# Patient Record
Sex: FEMALE | HISPANIC OR LATINO | Employment: PART TIME | URBAN - METROPOLITAN AREA
[De-identification: names, ages, dates, MRNs, and addresses within clinical notes are randomized per-mention and may not be internally consistent; named-entity substitution may affect disease eponyms.]

---

## 2020-05-12 ENCOUNTER — HOSPITAL ENCOUNTER (OUTPATIENT)
Dept: ULTRASOUND IMAGING | Facility: HOSPITAL | Age: 30
Discharge: HOME | End: 2020-05-12
Admitting: NURSE PRACTITIONER

## 2020-05-12 ENCOUNTER — TRANSCRIBE ORDERS (OUTPATIENT)
Dept: ADMINISTRATIVE | Facility: HOSPITAL | Age: 30
End: 2020-05-12

## 2020-05-12 ENCOUNTER — HOSPITAL ENCOUNTER (OUTPATIENT)
Dept: ULTRASOUND IMAGING | Facility: HOSPITAL | Age: 30
Discharge: HOME | End: 2020-05-12

## 2020-05-12 DIAGNOSIS — Z98.890 HISTORY OF MATERNAL CARDIAC SURGERY: ICD-10-CM

## 2020-05-12 DIAGNOSIS — Z98.890 HISTORY OF MATERNAL CARDIAC SURGERY: Primary | ICD-10-CM

## 2020-05-12 DIAGNOSIS — O09.899 HISTORY OF MATERNAL CARDIAC SURGERY: ICD-10-CM

## 2020-05-12 DIAGNOSIS — O09.899 HISTORY OF MATERNAL CARDIAC SURGERY: Primary | ICD-10-CM

## 2020-05-12 LAB
EXTERNAL ABO GROUPING: NORMAL
EXTERNAL HEPATITIS B SURFACE ANTIGEN: NEGATIVE
EXTERNAL HEPATITIS C AB: NORMAL
EXTERNAL RH FACTOR: POSITIVE
EXTERNAL RUBELLA QUALITATIVE: NORMAL
HIV1 P24 AG SERPL QL IA: NEGATIVE

## 2020-05-12 PROCEDURE — 76801 OB US < 14 WKS SINGLE FETUS: CPT

## 2020-05-12 PROCEDURE — 76817 TRANSVAGINAL US OBSTETRIC: CPT

## 2020-05-13 ENCOUNTER — TRANSCRIBE ORDERS (OUTPATIENT)
Dept: ADMINISTRATIVE | Facility: HOSPITAL | Age: 30
End: 2020-05-13

## 2020-05-13 DIAGNOSIS — Z98.890 HISTORY OF MATERNAL CARDIAC SURGERY: Primary | ICD-10-CM

## 2020-05-13 DIAGNOSIS — O09.899 HISTORY OF MATERNAL CARDIAC SURGERY: Primary | ICD-10-CM

## 2020-06-08 ENCOUNTER — HOSPITAL ENCOUNTER (OUTPATIENT)
Dept: ULTRASOUND IMAGING | Facility: HOSPITAL | Age: 30
Discharge: HOME | End: 2020-06-08
Admitting: NURSE PRACTITIONER

## 2020-06-08 DIAGNOSIS — O09.899 HISTORY OF MATERNAL CARDIAC SURGERY: ICD-10-CM

## 2020-06-08 DIAGNOSIS — Z98.890 HISTORY OF MATERNAL CARDIAC SURGERY: ICD-10-CM

## 2020-06-08 PROCEDURE — 76817 TRANSVAGINAL US OBSTETRIC: CPT

## 2020-06-30 ENCOUNTER — TRANSCRIBE ORDERS (OUTPATIENT)
Dept: ADMINISTRATIVE | Facility: HOSPITAL | Age: 30
End: 2020-06-30

## 2020-06-30 DIAGNOSIS — O09.899 HX OF PRETERM DELIVERY, CURRENTLY PREGNANT: Primary | ICD-10-CM

## 2020-07-06 ENCOUNTER — HOSPITAL ENCOUNTER (OUTPATIENT)
Dept: ULTRASOUND IMAGING | Facility: HOSPITAL | Age: 30
Discharge: HOME | End: 2020-07-06
Admitting: NURSE PRACTITIONER

## 2020-07-06 ENCOUNTER — APPOINTMENT (OUTPATIENT)
Dept: ULTRASOUND IMAGING | Facility: HOSPITAL | Age: 30
End: 2020-07-06

## 2020-07-06 ENCOUNTER — HOSPITAL ENCOUNTER (OUTPATIENT)
Dept: ULTRASOUND IMAGING | Facility: HOSPITAL | Age: 30
End: 2020-07-06

## 2020-07-06 DIAGNOSIS — O09.899 HX OF PRETERM DELIVERY, CURRENTLY PREGNANT: ICD-10-CM

## 2020-07-06 PROCEDURE — 76811 OB US DETAILED SNGL FETUS: CPT

## 2020-09-22 ENCOUNTER — TRANSCRIBE ORDERS (OUTPATIENT)
Dept: ADMINISTRATIVE | Facility: HOSPITAL | Age: 30
End: 2020-09-22

## 2020-09-22 DIAGNOSIS — Z36.89 ULTRASOUND SCAN TO EVALUATE PLACENTA LOCATION: Primary | ICD-10-CM

## 2020-09-29 ENCOUNTER — HOSPITAL ENCOUNTER (OUTPATIENT)
Dept: ULTRASOUND IMAGING | Facility: HOSPITAL | Age: 30
Discharge: HOME | End: 2020-09-29
Admitting: NURSE PRACTITIONER

## 2020-09-29 DIAGNOSIS — Z36.89 ULTRASOUND SCAN TO EVALUATE PLACENTA LOCATION: ICD-10-CM

## 2020-09-29 PROCEDURE — 76815 OB US LIMITED FETUS(S): CPT

## 2020-10-27 ENCOUNTER — HOSPITAL ENCOUNTER (OUTPATIENT)
Facility: HOSPITAL | Age: 30
Discharge: HOME | End: 2020-10-27
Attending: OBSTETRICS & GYNECOLOGY | Admitting: OBSTETRICS & GYNECOLOGY

## 2020-10-27 VITALS
SYSTOLIC BLOOD PRESSURE: 113 MMHG | BODY MASS INDEX: 23.09 KG/M2 | OXYGEN SATURATION: 99 % | WEIGHT: 130.29 LBS | HEART RATE: 67 BPM | DIASTOLIC BLOOD PRESSURE: 67 MMHG | RESPIRATION RATE: 18 BRPM | HEIGHT: 63 IN | TEMPERATURE: 97.8 F

## 2020-10-27 PROBLEM — Z34.90 PREGNANT: Status: ACTIVE | Noted: 2020-10-27

## 2020-10-27 LAB — EXTERNAL GROUP B STREP ANTIGEN: NORMAL

## 2020-10-27 PROCEDURE — 87081 CULTURE SCREEN ONLY: CPT | Performed by: OBSTETRICS & GYNECOLOGY

## 2020-10-27 PROCEDURE — G0463 HOSPITAL OUTPT CLINIC VISIT: HCPCS

## 2020-10-27 RX ORDER — PRENATAL VIT NO.126/IRON/FOLIC 28MG-0.8MG
TABLET ORAL DAILY
COMMUNITY
End: 2021-04-22

## 2020-10-29 LAB — BACTERIA SPEC AEROBE CULT: NORMAL

## 2020-11-01 ENCOUNTER — HOSPITAL ENCOUNTER (INPATIENT)
Facility: HOSPITAL | Age: 30
LOS: 2 days | Discharge: HOME | End: 2020-11-03
Attending: OBSTETRICS & GYNECOLOGY | Admitting: OBSTETRICS & GYNECOLOGY

## 2020-11-01 LAB
ABO GROUP BLD: NORMAL
BLD GP AB SCN SERPL QL: NEGATIVE
DEPRECATED RDW RBC AUTO: 41.6 FL (ref 37–54)
ERYTHROCYTE [DISTWIDTH] IN BLOOD BY AUTOMATED COUNT: 12.9 % (ref 12.3–15.4)
HCT VFR BLD AUTO: 33.6 % (ref 34–46.6)
HGB BLD-MCNC: 11.7 G/DL (ref 12–15.9)
HIV1+2 AB SER QL: NORMAL
MCH RBC QN AUTO: 32.3 PG (ref 26.6–33)
MCHC RBC AUTO-ENTMCNC: 34.9 G/DL (ref 31.5–35.7)
MCV RBC AUTO: 92.3 FL (ref 79–97)
PLATELET # BLD AUTO: 239 10*3/MM3 (ref 140–450)
PMV BLD AUTO: 7.7 FL (ref 6–12)
RBC # BLD AUTO: 3.64 10*6/MM3 (ref 3.77–5.28)
RH BLD: POSITIVE
T&S EXPIRATION DATE: NORMAL
WBC # BLD AUTO: 6.1 10*3/MM3 (ref 3.4–10.8)

## 2020-11-01 PROCEDURE — G0432 EIA HIV-1/HIV-2 SCREEN: HCPCS | Performed by: OBSTETRICS & GYNECOLOGY

## 2020-11-01 PROCEDURE — 86901 BLOOD TYPING SEROLOGIC RH(D): CPT

## 2020-11-01 PROCEDURE — 86850 RBC ANTIBODY SCREEN: CPT | Performed by: OBSTETRICS & GYNECOLOGY

## 2020-11-01 PROCEDURE — 86900 BLOOD TYPING SEROLOGIC ABO: CPT

## 2020-11-01 PROCEDURE — 10907ZC DRAINAGE OF AMNIOTIC FLUID, THERAPEUTIC FROM PRODUCTS OF CONCEPTION, VIA NATURAL OR ARTIFICIAL OPENING: ICD-10-PCS | Performed by: OBSTETRICS & GYNECOLOGY

## 2020-11-01 PROCEDURE — 85027 COMPLETE CBC AUTOMATED: CPT | Performed by: OBSTETRICS & GYNECOLOGY

## 2020-11-01 PROCEDURE — 86900 BLOOD TYPING SEROLOGIC ABO: CPT | Performed by: OBSTETRICS & GYNECOLOGY

## 2020-11-01 PROCEDURE — 86592 SYPHILIS TEST NON-TREP QUAL: CPT | Performed by: OBSTETRICS & GYNECOLOGY

## 2020-11-01 PROCEDURE — 86901 BLOOD TYPING SEROLOGIC RH(D): CPT | Performed by: OBSTETRICS & GYNECOLOGY

## 2020-11-01 RX ORDER — LANOLIN 100 %
OINTMENT (GRAM) TOPICAL
Status: DISCONTINUED | OUTPATIENT
Start: 2020-11-01 | End: 2020-11-03 | Stop reason: HOSPADM

## 2020-11-01 RX ORDER — ONDANSETRON 4 MG/1
4 TABLET, FILM COATED ORAL EVERY 8 HOURS PRN
Status: DISCONTINUED | OUTPATIENT
Start: 2020-11-01 | End: 2020-11-03 | Stop reason: HOSPADM

## 2020-11-01 RX ORDER — IBUPROFEN 600 MG/1
600 TABLET ORAL EVERY 6 HOURS PRN
Status: DISCONTINUED | OUTPATIENT
Start: 2020-11-01 | End: 2020-11-03 | Stop reason: HOSPADM

## 2020-11-01 RX ORDER — PRENATAL VIT/IRON FUM/FOLIC AC 27MG-0.8MG
1 TABLET ORAL DAILY
Status: DISCONTINUED | OUTPATIENT
Start: 2020-11-01 | End: 2020-11-03 | Stop reason: HOSPADM

## 2020-11-01 RX ORDER — OXYTOCIN-SODIUM CHLORIDE 0.9% IV SOLN 30 UNIT/500ML 30-0.9/5 UT/ML-%
SOLUTION INTRAVENOUS
Status: COMPLETED
Start: 2020-11-01 | End: 2020-11-01

## 2020-11-01 RX ORDER — HYDROCORTISONE ACETATE PRAMOXINE HCL 2.5; 1 G/100G; G/100G
1 CREAM TOPICAL AS NEEDED
Status: DISCONTINUED | OUTPATIENT
Start: 2020-11-01 | End: 2020-11-03 | Stop reason: HOSPADM

## 2020-11-01 RX ORDER — OXYTOCIN-SODIUM CHLORIDE 0.9% IV SOLN 30 UNIT/500ML 30-0.9/5 UT/ML-%
125 SOLUTION INTRAVENOUS CONTINUOUS PRN
Status: COMPLETED | OUTPATIENT
Start: 2020-11-01 | End: 2020-11-01

## 2020-11-01 RX ORDER — DOCUSATE SODIUM 100 MG/1
100 CAPSULE, LIQUID FILLED ORAL 2 TIMES DAILY
Status: DISCONTINUED | OUTPATIENT
Start: 2020-11-01 | End: 2020-11-03 | Stop reason: HOSPADM

## 2020-11-01 RX ORDER — LIDOCAINE HYDROCHLORIDE 10 MG/ML
30 INJECTION, SOLUTION EPIDURAL; INFILTRATION; INTRACAUDAL; PERINEURAL AS NEEDED
Status: DISCONTINUED | OUTPATIENT
Start: 2020-11-01 | End: 2020-11-01 | Stop reason: HOSPADM

## 2020-11-01 RX ORDER — IBUPROFEN 600 MG/1
600 TABLET ORAL ONCE
Status: COMPLETED | OUTPATIENT
Start: 2020-11-01 | End: 2020-11-01

## 2020-11-01 RX ORDER — SODIUM CHLORIDE 0.9 % (FLUSH) 0.9 %
1-10 SYRINGE (ML) INJECTION AS NEEDED
Status: DISCONTINUED | OUTPATIENT
Start: 2020-11-01 | End: 2020-11-03 | Stop reason: HOSPADM

## 2020-11-01 RX ORDER — METHYLERGONOVINE MALEATE 0.2 MG/ML
200 INJECTION INTRAVENOUS ONCE AS NEEDED
Status: DISCONTINUED | OUTPATIENT
Start: 2020-11-01 | End: 2020-11-01 | Stop reason: HOSPADM

## 2020-11-01 RX ORDER — BISACODYL 10 MG
10 SUPPOSITORY, RECTAL RECTAL DAILY PRN
Status: DISCONTINUED | OUTPATIENT
Start: 2020-11-02 | End: 2020-11-03 | Stop reason: HOSPADM

## 2020-11-01 RX ORDER — MISOPROSTOL 200 UG/1
800 TABLET ORAL AS NEEDED
Status: DISCONTINUED | OUTPATIENT
Start: 2020-11-01 | End: 2020-11-01 | Stop reason: HOSPADM

## 2020-11-01 RX ORDER — MAGNESIUM CARB/ALUMINUM HYDROX 105-160MG
30 TABLET,CHEWABLE ORAL DAILY PRN
Status: DISCONTINUED | OUTPATIENT
Start: 2020-11-01 | End: 2020-11-01 | Stop reason: HOSPADM

## 2020-11-01 RX ORDER — OXYTOCIN-SODIUM CHLORIDE 0.9% IV SOLN 30 UNIT/500ML 30-0.9/5 UT/ML-%
999 SOLUTION INTRAVENOUS ONCE
Status: COMPLETED | OUTPATIENT
Start: 2020-11-01 | End: 2020-11-01

## 2020-11-01 RX ORDER — SODIUM CHLORIDE, SODIUM LACTATE, POTASSIUM CHLORIDE, CALCIUM CHLORIDE 600; 310; 30; 20 MG/100ML; MG/100ML; MG/100ML; MG/100ML
125 INJECTION, SOLUTION INTRAVENOUS CONTINUOUS
Status: DISCONTINUED | OUTPATIENT
Start: 2020-11-01 | End: 2020-11-01

## 2020-11-01 RX ORDER — CARBOPROST TROMETHAMINE 250 UG/ML
250 INJECTION, SOLUTION INTRAMUSCULAR AS NEEDED
Status: DISCONTINUED | OUTPATIENT
Start: 2020-11-01 | End: 2020-11-01 | Stop reason: HOSPADM

## 2020-11-01 RX ORDER — SODIUM CHLORIDE 0.9 % (FLUSH) 0.9 %
3 SYRINGE (ML) INJECTION EVERY 12 HOURS SCHEDULED
Status: DISCONTINUED | OUTPATIENT
Start: 2020-11-01 | End: 2020-11-01 | Stop reason: HOSPADM

## 2020-11-01 RX ORDER — OXYTOCIN-SODIUM CHLORIDE 0.9% IV SOLN 30 UNIT/500ML 30-0.9/5 UT/ML-%
250 SOLUTION INTRAVENOUS CONTINUOUS
Status: DISPENSED | OUTPATIENT
Start: 2020-11-01 | End: 2020-11-01

## 2020-11-01 RX ORDER — SODIUM CHLORIDE 0.9 % (FLUSH) 0.9 %
10 SYRINGE (ML) INJECTION AS NEEDED
Status: DISCONTINUED | OUTPATIENT
Start: 2020-11-01 | End: 2020-11-01 | Stop reason: HOSPADM

## 2020-11-01 RX ADMIN — OXYTOCIN 125 ML/HR: 10 INJECTION INTRAVENOUS at 09:49

## 2020-11-01 RX ADMIN — IBUPROFEN 600 MG: 600 TABLET ORAL at 21:46

## 2020-11-01 RX ADMIN — OXYTOCIN 30 UNITS: 10 INJECTION INTRAVENOUS at 08:10

## 2020-11-01 RX ADMIN — IBUPROFEN 600 MG: 600 TABLET ORAL at 15:27

## 2020-11-01 RX ADMIN — SODIUM CHLORIDE, POTASSIUM CHLORIDE, SODIUM LACTATE AND CALCIUM CHLORIDE 125 ML/HR: 600; 310; 30; 20 INJECTION, SOLUTION INTRAVENOUS at 05:35

## 2020-11-01 RX ADMIN — DOCUSATE SODIUM 100 MG: 100 CAPSULE, LIQUID FILLED ORAL at 21:46

## 2020-11-01 RX ADMIN — PRENATAL VIT W/ FE FUMARATE-FA TAB 27-0.8 MG 1 TABLET: 27-0.8 TAB at 15:01

## 2020-11-01 RX ADMIN — DOCUSATE SODIUM 100 MG: 100 CAPSULE, LIQUID FILLED ORAL at 15:00

## 2020-11-01 RX ADMIN — OXYTOCIN-SODIUM CHLORIDE 0.9% IV SOLN 30 UNIT/500ML 30 UNITS: 30-0.9/5 SOLUTION at 08:10

## 2020-11-01 RX ADMIN — WITCH HAZEL 1 PAD: 500 SOLUTION RECTAL; TOPICAL at 18:15

## 2020-11-01 RX ADMIN — BENZOCAINE 1 SPRAY: 11.4 AEROSOL, SPRAY TOPICAL at 18:15

## 2020-11-01 RX ADMIN — IBUPROFEN 600 MG: 600 TABLET, FILM COATED ORAL at 08:56

## 2020-11-01 NOTE — H&P
SHELTON Anderson  Obstetric History and Physical     Chief Complaint: labor    Subjective     Patient is a 30 y.o. female  currently at 36w5d, who presents with labor.    Her prenatal care is complicated by  sexually transmitted disease  trichomoniasis.  Her previous obstetric/gynecological history is noted for PTD, PIH.      Prenatal Information:  Prenatal Results     POC Urine Glucose/Protein     Test Value Reference Range Date Time    Urine Glucose        Urine Protein              Initial Prenatal Labs     Test Value Reference Range Date Time    Hemoglobin        Hematocrit        Platelets 239 10*3/mm3 140 - 450 20    Rubella IgG Immune   20     Hepatitis B SAg Negative   20     Hepatitis C Ab neg   20     RPR        ABO O   20    Rh Positive   20    Antibody Screen        HIV Non-Reactive  Non-Reactive 20      Negative   20     Urine Culture        Gonorrhea        Chlamydia        TSH              2nd and 3rd Trimester     Test Value Reference Range Date Time    Hemoglobin (repeated) 11.7 g/dL 12.0 - 15.9 20    Hematocrit (repeated) 33.6 % 34.0 - 46.6 20    GCT        Antibody Screen (repeated) Negative   20    GTT Fasting        GTT 1 Hr        GTT 2 Hr        GTT 3 Hr        Group B Strep No Group B Streptococcus isolated   10/27/20 1336      Unknown   10/27/20           Drug Screening     Test Value Reference Range Date Time    Amphetamine Screen        Barbiturate Screen        Benzodiazepine Screen        Methadone Screen        Phencyclidine Screen        Opiates Screen        THC Screen        Cocaine Screen        Propoxyphene Screen        Buprenorphine Screen        Methamphetamine Screen        Oxycodone Screen        Tricyclic Antidepressants Screen              Other (Risk screening)     Test Value Reference Range Date Time    Varicella IgG        Parvovirus IgG        CMV IgG        Cystic  Fibrosis        Hemoglobin electrophoresis        NIPT        MSAFP-4        AFP (for NTD only)                  External Prenatal Results     Pregnancy Outside Results - Transcribed From Office Records - See Scanned Records For Details     Test Value Date Time    Hgb 11.7 g/dL 11/01/20 0524    Hct 33.6 % 11/01/20 0524    ABO O  11/01/20 0523    Rh Positive  11/01/20 0523    Antibody Screen Negative  11/01/20 0523    Glucose Fasting GTT       Glucose Tolerance Test 1 hour       Glucose Tolerance Test 3 hour       Gonorrhea (discrete)       Chlamydia (discrete)       RPR       VDRL       Syphilis Antibody       Rubella Immune  05/12/20     HBsAg Negative  05/12/20     Herpes Simplex Virus PCR       Herpes Simplex VIrus Culture       HIV Non-Reactive  11/01/20 0523      Negative  05/12/20     Hep C RNA Quant PCR       Hep C Antibody neg  05/12/20     AFP       Group B Strep No Group B Streptococcus isolated  10/27/20 1336      Unknown  10/27/20     GBS Susceptibility to Clindamycin       GBS Susceptibility to Erythromycin       Fetal Fibronectin       Genetic Testing, Maternal Blood             Drug Screening     Test Value Date Time    Urine Drug Screen       Amphetamine Screen       Barbiturate Screen       Benzodiazepine Screen       Methadone Screen       Phencyclidine Screen       Opiates Screen       THC Screen       Cocaine Screen       Propoxyphene Screen       Buprenorphine Screen       Methamphetamine Screen       Oxycodone Screen       Tricyclic Antidepressants Screen                    Past OB History: PIH 1st pregnancy, delivered at 36 wks, 5lb                                            PTD with 2nd pregnancy at 32 wks       Past Medical History: Past Medical History:   Diagnosis Date   • Urogenital trichomoniasis     treated throughout pregnancy         Past Surgical History History reviewed. No pertinent surgical history.      Family History: History reviewed. No pertinent family history.   Social  "History:  reports that she has never smoked. She has never used smokeless tobacco.   reports previous alcohol use.   reports no history of drug use.        General ROS: Pertinent items are noted in HPI    Objective      Vitals:     Vitals:    11/01/20 0530 11/01/20 0540 11/01/20 0541 11/01/20 0600   BP: 144/90   150/90   BP Location: Right arm      Patient Position: Sitting      Pulse: 58   56   Resp: 18      Temp: 97.6 °F (36.4 °C)      TempSrc: Oral      SpO2:  99%     Weight:   57.2 kg (126 lb 1.7 oz)    Height:   160 cm (63\")        Fetal Heart Rate Assessment:   130s, cat I    [place]:   ctxs q 1-5 min     Physical Exam:     General Appearance:    Alert, cooperative, in no acute distress   Lungs:     Clear to auscultation,respirations regular.    Heart:    Regular rhythm and normal rate.   Breast Exam:    Deferred   Abdomen:     Normal bowel sounds, no masses, soft nontender,         nondistended, no guarding, no rebound tenderness   Pelvic Exam:    EFW 6lbs    Presentation: vtx    Cervix: r/c/0; had AROM 1 hr 30 min ago with clear fluid at 8 cm   Extremities:   Moves all extremities well, no edema, no cyanosis, no           redness   Skin:   No bleeding, bruising or rash   Neurologic:   No focal neurologic defect          Laboratory Results:   Lab Results (last 48 hours)     Procedure Component Value Units Date/Time    HIV-1 & HIV-2 Antibodies [[id number]]  (Normal) Collected: 11/01/20 0523    Specimen: Blood Updated: 11/01/20 0637    Narrative:      The following orders were created for panel order HIV-1 & HIV-2 Antibodies.  Procedure                               Abnormality         Status                     ---------                               -----------         ------                     HIV-1 / O / 2 Ag / Antib...[[id number]]  Normal              Final result                 Please view results for these tests on the individual orders.    HIV-1 / O / 2 Ag / Antibody 4th Generation [[id number]]  (Normal) " Collected: 11/01/20 0523    Specimen: Blood Updated: 11/01/20 0637     HIV-1/ HIV-2 Non-Reactive     Comment: A non-reactive test result does not preclude the possibility of exposure to HIV or infection with HIV. An antibody response to recent exposure may take several months to reach detectable levels.       Narrative:      The HIV antibody/antigen combo assay is a qualitative assay for HIV that includes the p24 antigen as well as antibodies to HIV types 1 and 2. This test is intended to be used as a screening assay in the diagnosis of HIV infection in patients over the age of 2.  Results may be falsely decreased if patient taking Biotin.      CBC (No Diff) [[id number]]  (Abnormal) Collected: 11/01/20 0524    Specimen: Blood Updated: 11/01/20 0540     WBC 6.10 10*3/mm3      RBC 3.64 10*6/mm3      Hemoglobin 11.7 g/dL      Hematocrit 33.6 %      MCV 92.3 fL      MCH 32.3 pg      MCHC 34.9 g/dL      RDW 12.9 %      RDW-SD 41.6 fl      MPV 7.7 fL      Platelets 239 10*3/mm3     RPR [[id number]] Collected: 11/01/20 0523    Specimen: Blood Updated: 11/01/20 0532          Other Studies:       Assessment/Plan     Active Problems:    Pregnant         Assessment:  1.  Intrauterine pregnancy at 36w5d gestation with reactive, reassuring fetal status.    2.  labor  with ROM   3.  Obstetrical history significant for PIH, PTD.  4.  GBS status:   Group B Strep Culture   Date Value Ref Range Status   10/27/2020 No Group B Streptococcus isolated  Final       Plan:  1. fetal and uterine monitoring  continuously and expectant management  2. Plan of care has been reviewed with patient.  3.  Risks, benefits of treatment plan have been discussed.  4.  All questions have been answered.         Georgia Lorenzo MD   11/1/2020   07:53 EST

## 2020-11-01 NOTE — PLAN OF CARE
Goal Outcome Evaluation:      Pt continues to labor at this time. Contractions regular. Nitrous used for pain relief. Category 1 tracing.

## 2020-11-01 NOTE — L&D DELIVERY NOTE
[facility]  Vaginal Delivery Note    Diagnosis     Patient is a 30 y.o. female  currently at 36w5d, who presents with labor.      Delivery     Delivery:  Spontaneous Vaginal Delivery    Date of Delivery:  2020   Anesthesia: None     Delivering clinician: Georgia Lorenzo MD      Delivery narrative: Patient presented in labor.  She progressed rapidly from 6 to 8 cm.  She artificial rupture membranes.  She progressed more slowly to complete.  She began pushing.  She pushed less than 20 minutes.  The baby tolerated pushing well.  Fetal heart tracing was a category 1 throughout.  She has spontaneous vaginal delivery over an intact perineum.  The rest of the infant was delivered and placed on mother's abdomen the cord was clamped x2 and cut after pulsations stop.  Cord blood was obtained.  Placenta delivered spontaneous, intact, with a three-vessel cord.  Of note before she began pushing her she was OP and delivered in [place].  Infant was still moving all extremities well and of good color and tone.  There were no lacerations.  Mother and infant are recovering well at the time this dictation.    Infant    Findings: VFI     Apgars:       APGARS  One minute Five minutes Ten minutes Fifteen minutes Twenty minutes   Skin color:                 Heart rate:                 Grimace:                  Muscle tone:                  Breathing:                  Totals:                        Placenta, Cord, and Fluid    Placenta delivered  spontaneous  3VC          Lacerations       had no lacerations. with 3.0 Vicryl rapide in the usual fashion.     Estimated Blood Loss 300cc     Complications  none    Disposition  Mother to Mother Baby/Postpartum  in stable condition currently.  Baby to NBN  in stable condition currently.      Georgia Lorenzo MD  20  08:56 EST

## 2020-11-01 NOTE — PLAN OF CARE
Goal Outcome Evaluation:         Pt doing well , voiding and ambulating appropiateley. Pain controlled

## 2020-11-01 NOTE — SIGNIFICANT NOTE
This note also relates to the following rows which could not be included:  Heart Rate - Cannot attach notes to unvalidated device data    Notified Dr. Lorenzo of new admission.  36.5 days pt of clinic presents to the unit with ctxs every 5 mins. Pt is rating her pain a 9-10 and wishes to go natural. Pt is breathing through ctxs. Category 1 tracing. Pt is 6 cm ,90% and 0 station intact and vertex presentation. GBS negative. New orders to admit pt for labor at this time and notify MD when needed.

## 2020-11-02 LAB
BASOPHILS # BLD AUTO: 0 10*3/MM3 (ref 0–0.2)
BASOPHILS NFR BLD AUTO: 0.3 % (ref 0–1.5)
DEPRECATED RDW RBC AUTO: 42.4 FL (ref 37–54)
EOSINOPHIL # BLD AUTO: 0.1 10*3/MM3 (ref 0–0.4)
EOSINOPHIL NFR BLD AUTO: 0.8 % (ref 0.3–6.2)
ERYTHROCYTE [DISTWIDTH] IN BLOOD BY AUTOMATED COUNT: 13 % (ref 12.3–15.4)
HCT VFR BLD AUTO: 30.7 % (ref 34–46.6)
HGB BLD-MCNC: 10.3 G/DL (ref 12–15.9)
LYMPHOCYTES # BLD AUTO: 1.6 10*3/MM3 (ref 0.7–3.1)
LYMPHOCYTES NFR BLD AUTO: 17.2 % (ref 19.6–45.3)
MCH RBC QN AUTO: 31.6 PG (ref 26.6–33)
MCHC RBC AUTO-ENTMCNC: 33.5 G/DL (ref 31.5–35.7)
MCV RBC AUTO: 94.2 FL (ref 79–97)
MONOCYTES # BLD AUTO: 0.7 10*3/MM3 (ref 0.1–0.9)
MONOCYTES NFR BLD AUTO: 7.2 % (ref 5–12)
NEUTROPHILS NFR BLD AUTO: 6.8 10*3/MM3 (ref 1.7–7)
NEUTROPHILS NFR BLD AUTO: 74.5 % (ref 42.7–76)
NRBC BLD AUTO-RTO: 0.1 /100 WBC (ref 0–0.2)
PLATELET # BLD AUTO: 210 10*3/MM3 (ref 140–450)
PMV BLD AUTO: 8.1 FL (ref 6–12)
RBC # BLD AUTO: 3.26 10*6/MM3 (ref 3.77–5.28)
RPR SER QL: NORMAL
WBC # BLD AUTO: 9.2 10*3/MM3 (ref 3.4–10.8)

## 2020-11-02 PROCEDURE — 25010000002 INFLUENZA VAC SPLIT QUAD 0.5 ML SUSPENSION PREFILLED SYRINGE: Performed by: OBSTETRICS & GYNECOLOGY

## 2020-11-02 PROCEDURE — G0008 ADMIN INFLUENZA VIRUS VAC: HCPCS | Performed by: OBSTETRICS & GYNECOLOGY

## 2020-11-02 PROCEDURE — 90686 IIV4 VACC NO PRSV 0.5 ML IM: CPT | Performed by: OBSTETRICS & GYNECOLOGY

## 2020-11-02 PROCEDURE — 85025 COMPLETE CBC W/AUTO DIFF WBC: CPT | Performed by: OBSTETRICS & GYNECOLOGY

## 2020-11-02 RX ADMIN — DOCUSATE SODIUM 100 MG: 100 CAPSULE, LIQUID FILLED ORAL at 08:05

## 2020-11-02 RX ADMIN — INFLUENZA VIRUS VACCINE 0.5 ML: 15; 15; 15; 15 SUSPENSION INTRAMUSCULAR at 15:05

## 2020-11-02 RX ADMIN — IBUPROFEN 600 MG: 600 TABLET ORAL at 20:21

## 2020-11-02 RX ADMIN — IBUPROFEN 600 MG: 600 TABLET ORAL at 07:22

## 2020-11-02 RX ADMIN — PRENATAL VIT W/ FE FUMARATE-FA TAB 27-0.8 MG 1 TABLET: 27-0.8 TAB at 08:05

## 2020-11-02 RX ADMIN — DOCUSATE SODIUM 100 MG: 100 CAPSULE, LIQUID FILLED ORAL at 20:21

## 2020-11-02 RX ADMIN — IBUPROFEN 600 MG: 600 TABLET ORAL at 13:36

## 2020-11-02 NOTE — PROGRESS NOTES
SHELTON Anderson  Postpartum Note    Subjective   Postpartum Day 1:  Spontaneous Vaginal Delivery    Patient without complaints. Her pain is well controlled with nonsteroidal anti-inflammatory drugs and prescribed pain medications. She is ambulating well.  Patient describes her bleeding as thin lochia.    Breastfeeding: infant latching without difficulty.    Objective     Vitals:  Vitals:    11/01/20 1839 11/01/20 2245 11/02/20 0325 11/02/20 0722   BP: 133/72 108/66 101/61 116/67   BP Location: Left arm Left arm Left arm Left arm   Patient Position: Lying Sitting Lying Lying   Pulse: 56 59 62 52   Resp: 17 16 16 16   Temp: 98.8 °F (37.1 °C) 98.2 °F (36.8 °C) 97.7 °F (36.5 °C) 98.2 °F (36.8 °C)   TempSrc: Oral Oral Oral Oral   SpO2: 98% 99% 99% 98%   Weight:       Height:           Physical Exam:  General:  Alert and oriented x3. No acute distress.  Abdomen: abdomen is soft without significant tenderness, masses, organomegaly or guarding. Fundus: appropriate, firm, non tender  Incision: N/A  Skin: Warm, Dry  Extremities: Normal,  trace edema. Nontender     Labs:  Results from last 7 days   Lab Units 11/02/20  0523 11/01/20  0524   WBC 10*3/mm3 9.20 6.10   HEMOGLOBIN g/dL 10.3* 11.7*   HEMATOCRIT % 30.7* 33.6*   PLATELETS 10*3/mm3 210 239            Feeding method: Breastfeeding Status: No     Blood Type: RH Positive        Assessment/Plan     Active Problems:    Pregnant      Ramya Ontiveros is Day 1  post-partum from a  Spontaneous Vaginal Delivery      Plan:  routine, continue present management and plan d/c tomorrow.       Marguerite Lobo, APRN  11/2/2020  09:29 EST

## 2020-11-02 NOTE — PLAN OF CARE
Goal Outcome Evaluation:  Plan of Care Reviewed With: patient  Progress: improving  Outcome Summary: Pt is up ad brynn. Pain controlled with motrin prn. formula feeding infant. will continue to monitor.

## 2020-11-02 NOTE — PLAN OF CARE
Goal Outcome Evaluation:  Plan of Care Reviewed With: patient  Progress: improving  Outcome Summary: pain controlled with prn motrin. up ad brynn. voiding qs. formula feeding infant.

## 2020-11-03 ENCOUNTER — APPOINTMENT (OUTPATIENT)
Dept: CARDIOLOGY | Facility: HOSPITAL | Age: 30
End: 2020-11-03

## 2020-11-03 VITALS
TEMPERATURE: 97.6 F | OXYGEN SATURATION: 99 % | DIASTOLIC BLOOD PRESSURE: 74 MMHG | HEART RATE: 56 BPM | WEIGHT: 126.1 LBS | SYSTOLIC BLOOD PRESSURE: 111 MMHG | HEIGHT: 63 IN | BODY MASS INDEX: 22.34 KG/M2 | RESPIRATION RATE: 15 BRPM

## 2020-11-03 LAB
BH CV LOW VAS LEFT VARICOSITY AK VESSEL: 1
BH CV LOWER VASCULAR LEFT COMMON FEMORAL AUGMENT: NORMAL
BH CV LOWER VASCULAR LEFT COMMON FEMORAL COMPETENT: NORMAL
BH CV LOWER VASCULAR LEFT COMMON FEMORAL COMPRESS: NORMAL
BH CV LOWER VASCULAR LEFT COMMON FEMORAL PHASIC: NORMAL
BH CV LOWER VASCULAR LEFT COMMON FEMORAL SPONT: NORMAL
BH CV LOWER VASCULAR LEFT DISTAL FEMORAL COMPRESS: NORMAL
BH CV LOWER VASCULAR LEFT GASTRONEMIUS COMPRESS: NORMAL
BH CV LOWER VASCULAR LEFT GREATER SAPH AK COMPRESS: NORMAL
BH CV LOWER VASCULAR LEFT GREATER SAPH BK COMPRESS: NORMAL
BH CV LOWER VASCULAR LEFT LESSER SAPH COMPRESS: NORMAL
BH CV LOWER VASCULAR LEFT MID FEMORAL AUGMENT: NORMAL
BH CV LOWER VASCULAR LEFT MID FEMORAL COMPETENT: NORMAL
BH CV LOWER VASCULAR LEFT MID FEMORAL COMPRESS: NORMAL
BH CV LOWER VASCULAR LEFT MID FEMORAL PHASIC: NORMAL
BH CV LOWER VASCULAR LEFT MID FEMORAL SPONT: NORMAL
BH CV LOWER VASCULAR LEFT PERONEAL COMPRESS: NORMAL
BH CV LOWER VASCULAR LEFT POPLITEAL AUGMENT: NORMAL
BH CV LOWER VASCULAR LEFT POPLITEAL COMPETENT: NORMAL
BH CV LOWER VASCULAR LEFT POPLITEAL COMPRESS: NORMAL
BH CV LOWER VASCULAR LEFT POPLITEAL PHASIC: NORMAL
BH CV LOWER VASCULAR LEFT POPLITEAL SPONT: NORMAL
BH CV LOWER VASCULAR LEFT POSTERIOR TIBIAL COMPRESS: NORMAL
BH CV LOWER VASCULAR LEFT PROXIMAL FEMORAL COMPRESS: NORMAL
BH CV LOWER VASCULAR LEFT SAPHENOFEMORAL JUNCTION COMPRESS: NORMAL
BH CV LOWER VASCULAR LEFT VARICOSITY AK COMPRESS: NORMAL
BH CV LOWER VASCULAR LEFT VARICOSITY AK THROMBUS: NORMAL
BH CV LOWER VASCULAR LEFT VARICOSITY BK COMPRESS: NORMAL
BH CV LOWER VASCULAR RIGHT COMMON FEMORAL AUGMENT: NORMAL
BH CV LOWER VASCULAR RIGHT COMMON FEMORAL COMPETENT: NORMAL
BH CV LOWER VASCULAR RIGHT COMMON FEMORAL COMPRESS: NORMAL
BH CV LOWER VASCULAR RIGHT COMMON FEMORAL PHASIC: NORMAL
BH CV LOWER VASCULAR RIGHT COMMON FEMORAL SPONT: NORMAL

## 2020-11-03 PROCEDURE — 93971 EXTREMITY STUDY: CPT

## 2020-11-03 RX ORDER — IBUPROFEN 600 MG/1
600 TABLET ORAL EVERY 6 HOURS PRN
Qty: 30 TABLET | Refills: 0 | Status: SHIPPED | OUTPATIENT
Start: 2020-11-03 | End: 2021-04-22

## 2020-11-03 RX ADMIN — PRENATAL VIT W/ FE FUMARATE-FA TAB 27-0.8 MG 1 TABLET: 27-0.8 TAB at 07:46

## 2020-11-03 RX ADMIN — DOCUSATE SODIUM 100 MG: 100 CAPSULE, LIQUID FILLED ORAL at 07:46

## 2020-11-03 RX ADMIN — IBUPROFEN 600 MG: 600 TABLET ORAL at 06:24

## 2020-11-03 NOTE — DISCHARGE SUMMARY
[facility]  Delivery Discharge Summary    Primary OB Clinician: Georgia Lorenzo MD    Admission Diagnosis: 36w5d IUP  Active Problems:    Pregnant      Discharge Diagnosis:  Same; delivered    Gestational Age: 36w5d    Date of Delivery: 2020     Delivered By:  Georgia Lorenzo     Delivery Type: Vaginal, Spontaneous      Tubal Ligation: n/a    Intrapartum Course: Uncomplicated delivery.     Postpartum Course:  Pt was admitted and underwent  Spontaneous Vaginal Delivery. Pt was transferred to PP where she had an uncomplicated course. Pt remained AFVSS, had scant lochia and pain was well controlled. Pt had some very mild swelling in LT upper leg - doppler obtained and superficial varicose thrombus noted - pt to monitor. Pt d/c home in stable condition and will f/u in office for PP visit as scheduled or PRN. Currently bottle feeding. Plans on BTL  for contraception.     Physical Exam:    Vitals:   Vitals:    20 0722 20 1300 20 2302 20 0859   BP: 116/67 115/72 112/61 111/74   BP Location: Left arm Left arm Left arm Right arm   Patient Position: Lying Sitting Lying Sitting   Pulse: 52 57 65 56   Resp: 16 16 16 15   Temp: 98.2 °F (36.8 °C) 97.3 °F (36.3 °C) 98.6 °F (37 °C) 97.6 °F (36.4 °C)   TempSrc: Oral Oral Oral Oral   SpO2: 98% 100% 96% 99%   Weight:       Height:         Temp (24hrs), Av.8 °F (36.6 °C), Min:97.3 °F (36.3 °C), Max:98.6 °F (37 °C)      General Appearance:    Alert, cooperative, in no acute distress   Abdomen:     Soft non-tender, non-distended, no guarding, no rebound         tenderness.   Extremities:   Moves all extremities well, no edema, no cyanosis, no              Redness.   Incision:  N/A   Fundus:   Firm, below umbilicus     Feeding method: Breastfeeding Status: No    Labs:  Results from last 7 days   Lab Units 20  0523 20  0524   WBC 10*3/mm3 9.20 6.10   HEMOGLOBIN g/dL 10.3* 11.7*   HEMATOCRIT % 30.7* 33.6*   PLATELETS 10*3/mm3 210 239           Blood  Type: RH Positive      Plan:  Discharge to home.    Follow-up appointment with Dr Lorenzo in 6 weeks.    KIANA Ragland  11/3/2020  11:15 EST

## 2020-11-03 NOTE — PROGRESS NOTES
Continued Stay Note  SHELTON Anderson     Patient Name: Ramya Ontiveros  MRN: [id number]  Today's Date: 11/3/2020    Admit Date: 11/1/2020    Discharge Plan     Row Name 11/03/20 1612       Plan    Plan Comments  [facility] conducted screening regarding possible d/c needs. [employer] screened and filed for Medicaid for pt, infant, and other children in the household. Denies any current needs at this time.     Phone communication only - no physical contact with patient or family.    KIERSTEN Charles    Phone: [phone]  Cell: [phone]  Fax: [phone]  [email or web address]

## 2020-11-03 NOTE — PLAN OF CARE
Goal Outcome Evaluation:  Plan of Care Reviewed With: patient  Progress: improving     Patient's pain well controlled. Bonding well with infant.

## 2020-11-04 NOTE — PROGRESS NOTES
Case Management Discharge Note                Selected Continued Care - Discharged on 11/3/2020 Admission date: 11/1/2020 - Discharge disposition: Home or Self Care    Destination    No services have been selected for the patient.              Durable Medical Equipment    No services have been selected for the patient.              Dialysis/Infusion    No services have been selected for the patient.              Home Medical Care    No services have been selected for the patient.              Therapy    No services have been selected for the patient.              Community Resources    No services have been selected for the patient.                       Final Discharge Disposition Code: 01 - home or self-care

## 2021-04-22 ENCOUNTER — HOSPITAL ENCOUNTER (EMERGENCY)
Facility: HOSPITAL | Age: 31
Discharge: HOME | End: 2021-04-22
Admitting: EMERGENCY MEDICINE

## 2021-04-22 ENCOUNTER — APPOINTMENT (OUTPATIENT)
Dept: ULTRASOUND IMAGING | Facility: HOSPITAL | Age: 31
End: 2021-04-22

## 2021-04-22 VITALS
OXYGEN SATURATION: 100 % | HEART RATE: 63 BPM | DIASTOLIC BLOOD PRESSURE: 68 MMHG | TEMPERATURE: 97.8 F | RESPIRATION RATE: 14 BRPM | SYSTOLIC BLOOD PRESSURE: 115 MMHG | HEIGHT: 62 IN | BODY MASS INDEX: 20.16 KG/M2 | WEIGHT: 109.57 LBS

## 2021-04-22 DIAGNOSIS — O20.9 VAGINAL BLEEDING AFFECTING EARLY PREGNANCY: Primary | ICD-10-CM

## 2021-04-22 LAB
ABO GROUP BLD: NORMAL
ALBUMIN SERPL-MCNC: 4.3 G/DL (ref 3.5–5.2)
ALBUMIN/GLOB SERPL: 1.5 G/DL
ALP SERPL-CCNC: 70 U/L (ref 39–117)
ALT SERPL W P-5'-P-CCNC: 7 U/L (ref 1–33)
ANION GAP SERPL CALCULATED.3IONS-SCNC: 11 MMOL/L (ref 5–15)
AST SERPL-CCNC: 23 U/L (ref 1–32)
BASOPHILS # BLD AUTO: 0 10*3/MM3 (ref 0–0.2)
BASOPHILS NFR BLD AUTO: 0.5 % (ref 0–1.5)
BILIRUB SERPL-MCNC: 0.9 MG/DL (ref 0–1.2)
BILIRUB UR QL STRIP: NEGATIVE
BUN SERPL-MCNC: 13 MG/DL (ref 6–20)
BUN/CREAT SERPL: 24.5 (ref 7–25)
CALCIUM SPEC-SCNC: 9.6 MG/DL (ref 8.6–10.5)
CHLORIDE SERPL-SCNC: 103 MMOL/L (ref 98–107)
CLARITY UR: CLEAR
CLUE CELLS SPEC QL WET PREP: ABNORMAL
CO2 SERPL-SCNC: 25 MMOL/L (ref 22–29)
COLOR UR: YELLOW
CREAT SERPL-MCNC: 0.53 MG/DL (ref 0.57–1)
DEPRECATED RDW RBC AUTO: 41.6 FL (ref 37–54)
EOSINOPHIL # BLD AUTO: 0.1 10*3/MM3 (ref 0–0.4)
EOSINOPHIL NFR BLD AUTO: 1.3 % (ref 0.3–6.2)
ERYTHROCYTE [DISTWIDTH] IN BLOOD BY AUTOMATED COUNT: 12.9 % (ref 12.3–15.4)
GFR SERPL CREATININE-BSD FRML MDRD: 133 ML/MIN/1.73
GFR SERPL CREATININE-BSD FRML MDRD: >150 ML/MIN/1.73
GLOBULIN UR ELPH-MCNC: 2.9 GM/DL
GLUCOSE SERPL-MCNC: 82 MG/DL (ref 65–99)
GLUCOSE UR STRIP-MCNC: NEGATIVE MG/DL
HCG INTACT+B SERPL-ACNC: 180.5 MIU/ML
HCT VFR BLD AUTO: 38.7 % (ref 34–46.6)
HGB BLD-MCNC: 13 G/DL (ref 12–15.9)
HGB UR QL STRIP.AUTO: NEGATIVE
HYDATID CYST SPEC WET PREP: ABNORMAL
KETONES UR QL STRIP: NEGATIVE
LEUKOCYTE ESTERASE UR QL STRIP.AUTO: NEGATIVE
LIPASE SERPL-CCNC: 55 U/L (ref 13–60)
LYMPHOCYTES # BLD AUTO: 1.1 10*3/MM3 (ref 0.7–3.1)
LYMPHOCYTES NFR BLD AUTO: 13.4 % (ref 19.6–45.3)
MCH RBC QN AUTO: 31.2 PG (ref 26.6–33)
MCHC RBC AUTO-ENTMCNC: 33.7 G/DL (ref 31.5–35.7)
MCV RBC AUTO: 92.5 FL (ref 79–97)
MONOCYTES # BLD AUTO: 0.5 10*3/MM3 (ref 0.1–0.9)
MONOCYTES NFR BLD AUTO: 6.8 % (ref 5–12)
NEUTROPHILS NFR BLD AUTO: 6.3 10*3/MM3 (ref 1.7–7)
NEUTROPHILS NFR BLD AUTO: 78 % (ref 42.7–76)
NITRITE UR QL STRIP: NEGATIVE
NRBC BLD AUTO-RTO: 0 /100 WBC (ref 0–0.2)
PH UR STRIP.AUTO: 5.5 [PH] (ref 5–8)
PLATELET # BLD AUTO: 238 10*3/MM3 (ref 140–450)
PMV BLD AUTO: 8 FL (ref 6–12)
POTASSIUM SERPL-SCNC: 3.6 MMOL/L (ref 3.5–5.2)
PROT SERPL-MCNC: 7.2 G/DL (ref 6–8.5)
PROT UR QL STRIP: NEGATIVE
RBC # BLD AUTO: 4.18 10*6/MM3 (ref 3.77–5.28)
RH BLD: POSITIVE
SODIUM SERPL-SCNC: 139 MMOL/L (ref 136–145)
SP GR UR STRIP: 1.01 (ref 1–1.03)
T VAGINALIS SPEC QL WET PREP: ABNORMAL
UROBILINOGEN UR QL STRIP: NORMAL
WBC # BLD AUTO: 8.1 10*3/MM3 (ref 3.4–10.8)
WBC SPEC QL WET PREP: ABNORMAL
YEAST GENITAL QL WET PREP: ABNORMAL

## 2021-04-22 PROCEDURE — 86900 BLOOD TYPING SEROLOGIC ABO: CPT | Performed by: PHYSICIAN ASSISTANT

## 2021-04-22 PROCEDURE — 87210 SMEAR WET MOUNT SALINE/INK: CPT | Performed by: PHYSICIAN ASSISTANT

## 2021-04-22 PROCEDURE — 76801 OB US < 14 WKS SINGLE FETUS: CPT

## 2021-04-22 PROCEDURE — 93976 VASCULAR STUDY: CPT

## 2021-04-22 PROCEDURE — 81003 URINALYSIS AUTO W/O SCOPE: CPT | Performed by: PHYSICIAN ASSISTANT

## 2021-04-22 PROCEDURE — 76817 TRANSVAGINAL US OBSTETRIC: CPT

## 2021-04-22 PROCEDURE — 83690 ASSAY OF LIPASE: CPT | Performed by: PHYSICIAN ASSISTANT

## 2021-04-22 PROCEDURE — 86901 BLOOD TYPING SEROLOGIC RH(D): CPT | Performed by: PHYSICIAN ASSISTANT

## 2021-04-22 PROCEDURE — 85025 COMPLETE CBC W/AUTO DIFF WBC: CPT | Performed by: PHYSICIAN ASSISTANT

## 2021-04-22 PROCEDURE — P9612 CATHETERIZE FOR URINE SPEC: HCPCS

## 2021-04-22 PROCEDURE — 80053 COMPREHEN METABOLIC PANEL: CPT | Performed by: PHYSICIAN ASSISTANT

## 2021-04-22 PROCEDURE — 84702 CHORIONIC GONADOTROPIN TEST: CPT | Performed by: PHYSICIAN ASSISTANT

## 2021-04-22 PROCEDURE — 99285 EMERGENCY DEPT VISIT HI MDM: CPT

## 2021-04-22 RX ORDER — SODIUM CHLORIDE 0.9 % (FLUSH) 0.9 %
10 SYRINGE (ML) INJECTION AS NEEDED
Status: DISCONTINUED | OUTPATIENT
Start: 2021-04-22 | End: 2021-04-22 | Stop reason: HOSPADM

## 2021-04-22 NOTE — ED NOTES
Patients reports some vaginal bleeding started today.  Patient reports she is 5 weeks pregnant.  Patient reports this is her 4th pregnancy.  Patient reports some abdominal pain  And low back pain.       Eda Walker RN  04/22/21 [id number]

## 2021-04-22 NOTE — DISCHARGE INSTRUCTIONS
Follow-up with your OB/GYN or 1 as listed below for further evaluation and management.  Have repeat hCG done in 48 to 72 hours.    Nothing in the vagina until otherwise specified by OB/GYN.  Use pads as needed for vaginal bleeding.    Follow-up with your primary care provider in 3-5 days.  If you do not have a primary care provider call [phone] for help in finding one, or you may follow up with [facility] at [phone].    Return to ED for any new or worsening symptoms

## 2021-04-22 NOTE — ED PROVIDER NOTES
Subjective   Patient is a 31-year-old  female who is G4, P3 Ab0 about 5 to 6 weeks gestation who presents with complaints of vaginal bleeding that started this morning.  Patient states she has had a little bit of clotting.  Patient has not followed up with OB/GYN yet.  She reports mild lower abdominal pain that radiates to her low back at times.  Patient states she took currently takes no medications and has no pertinent medical history.  Patient also denies any fever body aches or chills chest pain or shortness of breath.  No urinary symptoms including dysuria hematuria diarrhea or constipation.  No recent travel or known sick contacts.          Review of Systems   Constitutional: Negative.    HENT: Negative.    Eyes: Negative for photophobia and visual disturbance.   Respiratory: Negative.    Cardiovascular: Negative.    Gastrointestinal: Positive for abdominal pain. Negative for abdominal distention, constipation, diarrhea, nausea and vomiting.   Genitourinary: Positive for vaginal bleeding. Negative for decreased urine volume, difficulty urinating, dysuria, flank pain, hematuria, urgency, vaginal discharge and vaginal pain.   Musculoskeletal: Negative for back pain, neck pain and neck stiffness.   Skin: Negative.    Neurological: Negative.    Hematological: Negative.        Past Medical History:   Diagnosis Date   • Urogenital trichomoniasis     treated throughout pregnancy       No Known Allergies    History reviewed. No pertinent surgical history.    History reviewed. No pertinent family history.    Social History     Socioeconomic History   • Marital status: Single     Spouse name: Not on file   • Number of children: Not on file   • Years of education: Not on file   • Highest education level: Not on file   Tobacco Use   • Smoking status: Never Smoker   • Smokeless tobacco: Never Used   Substance and Sexual Activity   • Alcohol use: Not Currently   • Drug use: Never   • Sexual activity: Yes            Objective   Physical Exam  Vitals and nursing note reviewed. Exam conducted with a chaperone present.   Constitutional:       General: She is not in acute distress.     Appearance: She is well-developed. She is not ill-appearing, toxic-appearing or diaphoretic.   HENT:      Head: Normocephalic and atraumatic.      Mouth/Throat:      Mouth: Mucous membranes are moist.      Pharynx: Oropharynx is clear.   Eyes:      General: No scleral icterus.     Extraocular Movements: Extraocular movements intact.      Pupils: Pupils are equal, round, and reactive to light.   Cardiovascular:      Rate and Rhythm: Normal rate and regular rhythm.      Heart sounds: No murmur heard.   No friction rub. No gallop.    Pulmonary:      Effort: Pulmonary effort is normal. No respiratory distress.      Breath sounds: Normal breath sounds. No stridor. No wheezing, rhonchi or rales.   Chest:      Chest wall: No tenderness.   Abdominal:      General: Bowel sounds are normal. There is no distension. There are no signs of injury.      Palpations: Abdomen is soft. There is no fluid wave, hepatomegaly, splenomegaly or mass.      Tenderness: There is abdominal tenderness in the suprapubic area. There is no right CVA tenderness, left CVA tenderness, guarding or rebound. Negative signs include Pendleton's sign and McBurney's sign.      Hernia: No hernia is present.   Genitourinary:     Comments: Normal hair distribution, no lesions, masses, or swelling. No malodorous discharge noted from vagina.  Speculum exam performed: About 5 to 10 cc of blood noted in vaginal vault no abnormal discharge or odor.  Os closed however there was some blood noted from the os.  No cervical motion tenderness.  Skin:     General: Skin is warm.      Capillary Refill: Capillary refill takes less than 2 seconds.      Coloration: Skin is not cyanotic, jaundiced or pale.      Findings: No rash.   Neurological:      General: No focal deficit present.      Mental Status:  "She is alert and oriented to person, place, and time.   Psychiatric:         Mood and Affect: Mood normal.         Behavior: Behavior normal.         Procedures           ED Course  ED Course as of Apr 22 1654   Thu Apr 22, 2021   1310 Waiting to hear back from OB/GYN fbefore disposition.    [AA]      ED Course User Index  [AA] Mary Waite PA      /68 (BP Location: Left arm, Patient Position: Sitting)   Pulse 63   Temp 97.8 °F (36.6 °C) (Oral)   Resp 14   Ht 157.5 cm (62\")   Wt 49.7 kg (109 lb 9.1 oz)   LMP 03/13/2021   SpO2 100%   BMI 20.04 kg/m²   Medications - No data to display  Labs Reviewed   WET PREP, GENITAL - Abnormal; Notable for the following components:       Result Value    WBC'S 2+ WBC's seen (*)     All other components within normal limits   CBC WITH AUTO DIFFERENTIAL - Abnormal; Notable for the following components:    Neutrophil % 78.0 (*)     Lymphocyte % 13.4 (*)     All other components within normal limits   COMPREHENSIVE METABOLIC PANEL - Abnormal; Notable for the following components:    Creatinine 0.53 (*)     All other components within normal limits    Narrative:     GFR Normal >60  Chronic Kidney Disease <60  Kidney Failure <15     URINALYSIS W/ CULTURE IF INDICATED - Normal    Narrative:     Urine microscopic not indicated.   LIPASE - Normal   HCG, QUANTITATIVE, PREGNANCY    Narrative:     HCG Ranges by Gestational Age    Females - non-pregnant premenopausal   </= 1mIU/mL HCG  Females - postmenopausal               </= 7mIU/mL HCG    3 Weeks         5.8 -    71.2 mIU/mL  4 Weeks         9.5 -     750 mIU/mL  5 Weeks         217 -   7,138 mIU/mL  6 Weeks         158 -  31,795 mIU/mL  7 Weeks       3,697 - 163,563 mIU/mL  8 Weeks      32,065 - 149,571 mIU/mL  9 Weeks      63,803 - 151,410 mIU/mL  10 Weeks     46,509 - 186,977 mIU/mL  12 Weeks     27,832 - 210,612 mIU/mL  14 Weeks     13,950 -  62,530 mIU/mL  15 Weeks     12,039 -  70,971 mIU/mL  16 Weeks      9,040 - "  56,451 mIU/mL  17 Weeks      8,175 -  55,868 mIU/mL  18 Weeks      8,099 -  58,176 mIU/mL  Results may be falsely decreased if patient taking Biotin.     ABO/RH   CBC AND DIFFERENTIAL    Narrative:     The following orders were created for panel order CBC & Differential.  Procedure                               Abnormality         Status                     ---------                               -----------         ------                     CBC Auto Differential[[id number]]        Abnormal            Final result                 Please view results for these tests on the individual orders.     US Ob < 14 Weeks Single or First Gestation    Result Date: 4/22/2021  Normal pelvic ultrasound. There is no sonographic evidence of an intrauterine pregnancy. Given her positive beta hCG level, correlation clinically is critical, ectopic pregnancy cannot be excluded. Correlation with serial beta-hCG levels is recommended. Short-term follow-up pelvic ultrasound may be helpful if warranted clinically.  Electronically Signed By-Arley Carvalho DO On:4/22/2021 11:53 AM This report was finalized on [id number] by  Arley Carvalho DO.    US Ob Transvaginal    Result Date: 4/22/2021  Normal pelvic ultrasound. There is no sonographic evidence of an intrauterine pregnancy. Given her positive beta hCG level, correlation clinically is critical, ectopic pregnancy cannot be excluded. Correlation with serial beta-hCG levels is recommended. Short-term follow-up pelvic ultrasound may be helpful if warranted clinically.  Electronically Signed By-Arley Carvalho DO On:4/22/2021 11:53 AM This report was finalized on [id number] by  Arley Carvalho DO.                                         MDM  Number of Diagnoses or Management Options  Vaginal bleeding affecting early pregnancy  Diagnosis management comments: Chart Review:  Comorbidity: As per past medical history  Labs: Urinalysis unremarkable for UTI wet prep shows 2+ WBCs no clue cells  trichomonas or yeast.  Rh+ hCG quant 180.5 lipase 55 CBC unremarkable hemoglobin 13 CMP shows creatinine 0.53 otherwise unremarkable  Imaging: Was interpreted by physician and reviewed by myself:  US Ob < 14 Weeks Single or First Gestation  Result Date: 4/22/2021  Normal pelvic ultrasound. There is no sonographic evidence of an intrauterine pregnancy. Given her positive beta hCG level, correlation clinically is critical, ectopic pregnancy cannot be excluded. Correlation with serial beta-hCG levels is recommended. Short-term follow-up pelvic ultrasound may be helpful if warranted clinically.  Electronically Signed By-Arley Carvalho DO On:4/22/2021 11:53 AM This report was finalized on [id number] by  Arley Carvalho DO.    US Ob Transvaginal  Result Date: 4/22/2021  Normal pelvic ultrasound. There is no sonographic evidence of an intrauterine pregnancy. Given her positive beta hCG level, correlation clinically is critical, ectopic pregnancy cannot be excluded. Correlation with serial beta-hCG levels is recommended. Short-term follow-up pelvic ultrasound may be helpful if warranted clinically.  Electronically Signed By-Arley Carvalho DO On:4/22/2021 11:53 AM This report was finalized on [id number] by  Arley Carvalho DO.    Disposition/Treatment:  Appropriate PPE was worn during exam and throughout all encounters with the patient.  When the ED IV was placed and labs were obtained patient was afebrile and appeared nontoxic.  Patient is hemodynamically stable with a hemoglobin of 13 she is not hypotensive or tachycardic.  Rh+ ultrasound as shown above recommended serial hCGs.  Patient states she has been seen at the [facility] before for her pregnancies but has not followed up regards to this pregnancy.  Did discuss case with on-call OB/GYN Dr. Pack who agreed that the patient can either be followed by [facility] or come to her office to see one of the nurse practitioners tomorrow or next week.  Lab  results and findings were discussed with the patient who voiced understanding of discharge instructions along with signs and symptoms requiring return to the ED.  Upon discharged patient was in stable condition with followup for a revaluation.  I stressed the importance of follow-up with OB/GYN for further evaluation and management.       Amount and/or Complexity of Data Reviewed  Clinical lab tests: reviewed  Tests in the radiology section of CPT®: reviewed        Final diagnoses:   Vaginal bleeding affecting early pregnancy       ED Disposition  ED Disposition     ED Disposition Condition Comment    Discharge Stable           [facility] EMERGENCY DEPARTMENT  [address] [address] [phone]  Go to   If symptoms worsen    PATIENT CONNECTION - [facility] [address] [phone]  Call   If you do not have a primary care provider    Mariela Lujan MD  [address] [place] [address] [phone]    Call today  Call office today to schedule appointment for later in the week or next week with one of their nurse practitioners.         Medication List      No changes were made to your prescriptions during this visit.          Mary Waite PA  04/22/21 0028

## 2021-04-24 ENCOUNTER — TRANSCRIBE ORDERS (OUTPATIENT)
Dept: ADMINISTRATIVE | Facility: HOSPITAL | Age: 31
End: 2021-04-24

## 2021-04-24 ENCOUNTER — LAB (OUTPATIENT)
Dept: LAB | Facility: HOSPITAL | Age: 31
End: 2021-04-24

## 2021-04-24 DIAGNOSIS — O20.0 THREATENED MISCARRIAGE: Primary | ICD-10-CM

## 2021-04-24 DIAGNOSIS — O20.0 THREATENED MISCARRIAGE: ICD-10-CM

## 2021-04-24 LAB — HCG INTACT+B SERPL-ACNC: 26.11 MIU/ML

## 2021-04-24 PROCEDURE — 36415 COLL VENOUS BLD VENIPUNCTURE: CPT

## 2021-04-24 PROCEDURE — 84702 CHORIONIC GONADOTROPIN TEST: CPT

## 2023-09-06 ENCOUNTER — HOSPITAL ENCOUNTER (EMERGENCY)
Facility: HOSPITAL | Age: 33
Discharge: HOME | End: 2023-09-06
Payer: MEDICAID

## 2023-09-06 VITALS
OXYGEN SATURATION: 98 % | RESPIRATION RATE: 18 BRPM | TEMPERATURE: 98 F | SYSTOLIC BLOOD PRESSURE: 116 MMHG | DIASTOLIC BLOOD PRESSURE: 69 MMHG | HEART RATE: 61 BPM | WEIGHT: 110 LBS | BODY MASS INDEX: 20.24 KG/M2 | HEIGHT: 62 IN

## 2023-09-06 DIAGNOSIS — N93.9 VAGINAL BLEEDING: Primary | ICD-10-CM

## 2023-09-06 LAB
B-HCG UR QL: NEGATIVE
BACTERIA UR QL AUTO: ABNORMAL /HPF
BILIRUB UR QL STRIP: ABNORMAL
CLARITY UR: ABNORMAL
COLOR UR: ABNORMAL
GLUCOSE UR STRIP-MCNC: NEGATIVE MG/DL
GRAN CASTS URNS QL MICRO: ABNORMAL /LPF
HGB UR QL STRIP.AUTO: ABNORMAL
HYALINE CASTS UR QL AUTO: ABNORMAL /LPF
KETONES UR QL STRIP: ABNORMAL
LEUKOCYTE ESTERASE UR QL STRIP.AUTO: ABNORMAL
NITRITE UR QL STRIP: NEGATIVE
PH UR STRIP.AUTO: 5.5 [PH] (ref 5–8)
PROT UR QL STRIP: ABNORMAL
RBC # UR STRIP: ABNORMAL /HPF
REF LAB TEST METHOD: ABNORMAL
SP GR UR STRIP: 1.02 (ref 1–1.03)
SQUAMOUS #/AREA URNS HPF: ABNORMAL /HPF
UROBILINOGEN UR QL STRIP: ABNORMAL
WBC # UR STRIP: ABNORMAL /HPF

## 2023-09-06 PROCEDURE — 81001 URINALYSIS AUTO W/SCOPE: CPT | Performed by: PHYSICIAN ASSISTANT

## 2023-09-06 PROCEDURE — 81025 URINE PREGNANCY TEST: CPT | Performed by: PHYSICIAN ASSISTANT

## 2023-09-06 PROCEDURE — 99282 EMERGENCY DEPT VISIT SF MDM: CPT

## 2023-09-06 NOTE — ED PROVIDER NOTES
Subjective   History of Present Illness  Patient is a 33-year-old female who presents with vaginal bleeding her last period was a month ago she is concerned she might be pregnant because her period is very light bleeding is very light and a few days early.  Has not taken a home pregnancy test.      Review of Systems   Genitourinary:  Positive for vaginal bleeding.     Past Medical History:   Diagnosis Date    Urogenital trichomoniasis     treated throughout pregnancy       No Known Allergies    No past surgical history on file.    No family history on file.    Social History     Socioeconomic History    Marital status: Single   Tobacco Use    Smoking status: Never    Smokeless tobacco: Never   Substance and Sexual Activity    Alcohol use: Not Currently    Drug use: Never    Sexual activity: Yes           Objective   Physical Exam  Vitals and nursing note reviewed.   Constitutional:       General: She is not in acute distress.     Appearance: She is well-developed. She is not ill-appearing, toxic-appearing or diaphoretic.   HENT:      Head: Normocephalic and atraumatic.      Nose: Nose normal.   Eyes:      Pupils: Pupils are equal, round, and reactive to light.   Pulmonary:      Effort: Pulmonary effort is normal.   Musculoskeletal:         General: Normal range of motion.      Cervical back: Normal range of motion.   Skin:     General: Skin is warm and dry.   Neurological:      General: No focal deficit present.      Mental Status: She is alert and oriented to person, place, and time.   Psychiatric:         Mood and Affect: Mood normal.         Behavior: Behavior normal.         Thought Content: Thought content normal.         Judgment: Judgment normal.       Procedures           ED Course                                           Medical Decision Making  Appropriate PPE worn during exam.    /69 (BP Location: Left arm, Patient Position: Sitting)   Pulse 61   Temp 98 °F (36.7 °C) (Oral)   Resp 18   Ht 157.5  "cm (62\")   Wt 49.9 kg (110 lb)   LMP 07/31/2023   SpO2 98%   BMI 20.12 kg/m²    Chart review:    Co-morbidities --  has a past medical history of Urogenital trichomoniasis.  Lab interpretation --  Labs viewed by me significant for the following:   Radiology interpretation --  X-rays reviewed by me and interpreted by radiologist:  No radiology results for the last day      Plan --patient is a 33-year-old female who presents with vaginal bleeding her pregnancy test was negative most likely her menstrual cycle was told to follow-up with OB/GYN as needed.  Was stable at time of discharge and agreement plan.    I discussed the findings and recommendations with the patient who voices understanding. Stable while in the ER.     Note Disclaimer: At [facility], we believe that sharing information builds trust and better relationships. You are receiving this note because you are receiving care at [facility] or recently visited. It is possible you will see health information before a provider has talked with you about it. This kind of information can be easy to misunderstand. To help you fully understand what it means for your health, we urge you to discuss this note with your provider.        Problems Addressed:  Vaginal bleeding: acute illness or injury        Final diagnoses:   Vaginal bleeding       ED Disposition  ED Disposition       ED Disposition   Discharge    Condition   Stable    Comment   --               No follow-up provider specified.       Medication List      No changes were made to your prescriptions during this visit.            Paulette Maddne PA-C  09/06/23 0852    "

## 2023-09-06 NOTE — ED NOTES
Pt states that her period was supposed to be last week states that she started with vaginal bleeding on Sunday that was small amount and brown in color, states she has had very little bleeding everyday since then today is bright red, she describes this blood to look different than her normal period and is concerned she is pregnant. Pt walk to restroom and gave urine sample.